# Patient Record
Sex: MALE | Race: BLACK OR AFRICAN AMERICAN
[De-identification: names, ages, dates, MRNs, and addresses within clinical notes are randomized per-mention and may not be internally consistent; named-entity substitution may affect disease eponyms.]

---

## 2020-07-09 ENCOUNTER — HOSPITAL ENCOUNTER (EMERGENCY)
Dept: HOSPITAL 62 - ER | Age: 45
LOS: 1 days | Discharge: HOME | End: 2020-07-10
Payer: COMMERCIAL
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
## 2020-07-09 DIAGNOSIS — M47.817: ICD-10-CM

## 2020-07-09 DIAGNOSIS — E10.9: ICD-10-CM

## 2020-07-09 DIAGNOSIS — M54.5: Primary | ICD-10-CM

## 2020-07-09 PROCEDURE — 99283 EMERGENCY DEPT VISIT LOW MDM: CPT

## 2020-07-09 PROCEDURE — 72110 X-RAY EXAM L-2 SPINE 4/>VWS: CPT

## 2020-07-10 VITALS — DIASTOLIC BLOOD PRESSURE: 70 MMHG | SYSTOLIC BLOOD PRESSURE: 132 MMHG
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
